# Patient Record
Sex: FEMALE | Race: BLACK OR AFRICAN AMERICAN | Employment: FULL TIME | URBAN - METROPOLITAN AREA
[De-identification: names, ages, dates, MRNs, and addresses within clinical notes are randomized per-mention and may not be internally consistent; named-entity substitution may affect disease eponyms.]

---

## 2018-10-01 ENCOUNTER — HOSPITAL ENCOUNTER (OUTPATIENT)
Dept: GENERAL RADIOLOGY | Age: 43
Discharge: HOME | End: 2018-10-01
Payer: COMMERCIAL

## 2018-10-01 DIAGNOSIS — M25.552 LEFT HIP PAIN: ICD-10-CM

## 2018-10-01 PROCEDURE — 73502 X-RAY EXAM HIP UNI 2-3 VIEWS: CPT

## 2018-10-10 ENCOUNTER — HOSPITAL ENCOUNTER (OUTPATIENT)
Dept: PHYSICAL THERAPY | Age: 43
Discharge: HOME | End: 2018-10-10
Payer: OTHER GOVERNMENT

## 2018-10-10 PROCEDURE — 97110 THERAPEUTIC EXERCISES: CPT

## 2018-10-10 PROCEDURE — 97161 PT EVAL LOW COMPLEX 20 MIN: CPT

## 2018-10-10 NOTE — PROGRESS NOTES
[facility]  [address], [address] [facility] [place]  Ph [phone]  Fx [phone]    Plan of Care/ Statement of Necessity for Physical Therapy Services    Patient name: Moon Beck Start of Care: 10/10/2018   Referral source: MYRON Danielle : 1975    Medical Diagnosis: Pain in left hip [M25.552]   Onset Date:2018    Treatment Diagnosis: Left hip pain   Prior Hospitalization: see medical history Provider#: [id number]   Medications: Verified on Patient summary List    Comorbidities: Headaches   Prior Level of Function: Running 5 days per week including marathon and half-marathon training, exercise classes at the gym 5 days per week      The Plan of Care and following information is based on the information from the initial evaluation. Assessment/ key information: Active 41year old female presents to OP PT with 7 month history of left hip pain. She displayed a sign of possible femoral acetabular impingement with a positive FADDIR test, but negative hip scour. She was also mildly TTP over left hip flexor tendon, but resisted hip flexor MMT did not reproduce symptoms. She displays decreased bilateral hip, knee, and ankle strength, poor neuromuscular control/balance, particularly with single leg activities. Brief running assessment revealed decreased oscar and bilateral lateral whips. Patient will benefit from skilled PT services to modify and progress therapeutic interventions, address functional mobility deficits, address strength deficits, analyze and cue movement patterns, analyze and modify body mechanics/ergonomics and address imbalance/dizziness to return to her PLOF.   Evaluation Complexity History LOW Complexity : Zero comorbidities / personal factors that will impact the outcome / POC; Examination HIGH Complexity : 4+ Standardized tests and measures addressing body structure, function, activity limitation and / or participation in recreation  ;Presentation LOW Complexity : Stable, uncomplicated  ;Clinical Decision Making LOW Complexity : FOTO score of   Overall Complexity Rating: LOW   Problem List: pain affecting function, decrease strength, impaired gait/ balance, decrease ADL/ functional abilitiies, decrease activity tolerance and decrease flexibility/ joint mobility   Treatment Plan may include any combination of the following: Therapeutic exercise, Therapeutic activities, Neuromuscular re-education, Physical agent/modality, Gait/balance training, Manual therapy, Patient education, Self Care training, Functional mobility training, Home safety training and Stair training  Patient / Family readiness to learn indicated by: asking questions, trying to perform skills and interest  Persons(s) to be included in education: patient (P)  Barriers to Learning/Limitations: None  Patient Goal (s): Run without pain in hip  Patient Self Reported Health Status: good  Rehabilitation Potential: good    Goals: To be accomplished in 8 weeks:  Long Term Goals: To be accomplished in 8 weeks:                        1. atient will report compliance with HEP 4-5x/week to aid in rehabilitation program.                        Status at IE: Initiated HEP                        Current: N/A                           2. Patient will increase B LE strength to 5/5 MMT throughout to aid in return to running. Status at IE:                        Current: N/A                           3. Patient will report pain no greater than 1-2/10 with running and sleeping to aid in return to PLOF. Status at IE: up to 8/10 with running                        Current: N/A                           4. Patent will perform 10 left single leg squats to 90* of hip flexion demonstrating good neuromuscular control to demonstrate ability to return to running.                         Status at IE: able to perform single leg squat to table with poor knee control in eccentric phase requiring rest before standing back up                        Current: N/A                           5. Patient will improve FOTO score to 82 points to demonstrate improvement in functional status. Status at IE: 75                        Current: N/A    Frequency / Duration: Patient to be seen 1 times per week for 8 weeks. Patient/ Caregiver education and instruction: Diagnosis, prognosis, activity modification and exercises   [x]  Plan of care has been reviewed with MANUEL Agarwal, PT 10/10/2018 5:13 PM    ________________________________________________________________________    I certify that the above Therapy Services are being furnished while the patient is under my care. I agree with the treatment plan and certify that this therapy is necessary.     Physician's Signature:____________Date:_________TIME:________    [employer], Date and Time must be completed for valid certification **  Please sign and return to [facility] [address]  [facility], [address]  Ph [phone]  Fx [phone]

## 2018-10-22 ENCOUNTER — APPOINTMENT (OUTPATIENT)
Dept: PHYSICAL THERAPY | Age: 43
End: 2018-10-22
Payer: OTHER GOVERNMENT

## 2019-09-11 NOTE — PROGRESS NOTES
[facility]  [address], [address] [facility] [place]  Ph [phone]  Fx [phone]    Physical Therapy Discharge Summary    Patient name: Cande Handley Start of Care: 10/10/18   Referral source: MYRON Yepez : 1975   Medical/Treatment Diagnosis: Pain in left hip [M25.552] Onset Date:3/2018     Prior Hospitalization: see medical history Provider#: [id number]   Medications: Verified on Patient Summary List    Comorbidities: Headaches  Prior Level of Function:Running 5 days per week including marathon and half-marathon training, exercise classes at the gym 5 days per week    Visits from Start of Care: 1    Missed Visits: 2    Reporting Period : 10/10/18 to 10/10/18    Summary of Care:    Unable to formally assess goals as pt failed to show for scheduled followup appts. Please DC to HEP at this time. Thank you for this referral. Pt will require new order if she requires further PT services. [address] [place] [address], S. be accomplished in 8 weeks:                        9. Yuliet Foley will report compliance with HEP 4-5x/week to aid in rehabilitation program.                        HXWIKM at IE: Initiated HEP                        Current: N/A                           6. Patient will increase B LE strength to 5/5 MMT throughout to aid in return to running.                        Status at IE:                        Current: N/A                           3. Patient will report pain no greater than 1-2/10 with running and sleeping to aid in return to PLOF.                         AXZUER at IE: up to 8/10 with running                        Current: N/A                           4. Patent will perform 10 left single leg squats to 90* of hip flexion demonstrating good neuromuscular control to demonstrate ability to return to running.                        Status at IE: able to perform single leg squat to table with poor knee control in eccentric phase requiring rest before standing back up                        Current: N/A                           5. Patient will improve FOTO score to 82 points to demonstrate improvement in functional status.                         FNFWBC at IE: 76                        Current: N/A  ASSESSMENT/RECOMMENDATIONS:  [x]Discontinue therapy: []Patient has reached or is progressing toward set goals      [x]Patient is non-compliant or has abdicated      []Due to lack of appreciable progress towards set goals    Terra Jones, PT 9/11/2019 3:24 PM

## 2019-10-09 PROBLEM — Z80.41 FAMILY HISTORY OF OVARIAN CANCER: Chronic | Status: ACTIVE | Noted: 2019-10-09

## 2022-03-20 PROBLEM — Z80.41 FAMILY HISTORY OF OVARIAN CANCER: Status: ACTIVE | Noted: 2019-10-09

## 2022-10-03 ENCOUNTER — APPOINTMENT (OUTPATIENT)
Dept: GENERAL RADIOLOGY | Age: 47
End: 2022-10-03
Attending: EMERGENCY MEDICINE
Payer: COMMERCIAL

## 2022-10-03 ENCOUNTER — HOSPITAL ENCOUNTER (EMERGENCY)
Age: 47
Discharge: HOME | End: 2022-10-03
Attending: EMERGENCY MEDICINE
Payer: COMMERCIAL

## 2022-10-03 VITALS
HEIGHT: 65 IN | BODY MASS INDEX: 25.83 KG/M2 | SYSTOLIC BLOOD PRESSURE: 138 MMHG | HEART RATE: 78 BPM | TEMPERATURE: 98.2 F | DIASTOLIC BLOOD PRESSURE: 86 MMHG | RESPIRATION RATE: 16 BRPM | OXYGEN SATURATION: 98 % | WEIGHT: 155 LBS

## 2022-10-03 DIAGNOSIS — R09.89 LABILE HYPERTENSION: Primary | ICD-10-CM

## 2022-10-03 DIAGNOSIS — R00.2 PALPITATIONS: ICD-10-CM

## 2022-10-03 LAB
ALBUMIN SERPL-MCNC: 4 G/DL (ref 3.4–5)
ALBUMIN/GLOB SERPL: 1.4 {RATIO} (ref 0.8–1.7)
ALP SERPL-CCNC: 71 U/L (ref 45–117)
ALT SERPL-CCNC: 23 U/L (ref 13–56)
ANION GAP SERPL CALC-SCNC: 4 MMOL/L (ref 3–18)
AST SERPL-CCNC: 17 U/L (ref 10–38)
ATRIAL RATE: 70 BPM
BASOPHILS # BLD: 0 K/UL (ref 0–0.1)
BASOPHILS NFR BLD: 1 % (ref 0–2)
BILIRUB SERPL-MCNC: 0.2 MG/DL (ref 0.2–1)
BUN SERPL-MCNC: 11 MG/DL (ref 7–18)
BUN/CREAT SERPL: 15 (ref 12–20)
CALCIUM SERPL-MCNC: 9.1 MG/DL (ref 8.5–10.1)
CALCULATED P AXIS, ECG09: 20 DEGREES
CALCULATED R AXIS, ECG10: 65 DEGREES
CALCULATED T AXIS, ECG11: 52 DEGREES
CHLORIDE SERPL-SCNC: 109 MMOL/L (ref 100–111)
CO2 SERPL-SCNC: 26 MMOL/L (ref 21–32)
CREAT SERPL-MCNC: 0.72 MG/DL (ref 0.6–1.3)
DIAGNOSIS, 93000: NORMAL
DIFFERENTIAL METHOD BLD: ABNORMAL
EOSINOPHIL # BLD: 0.1 K/UL (ref 0–0.4)
EOSINOPHIL NFR BLD: 1 % (ref 0–5)
ERYTHROCYTE [DISTWIDTH] IN BLOOD BY AUTOMATED COUNT: 12.5 % (ref 11.6–14.5)
GLOBULIN SER CALC-MCNC: 2.9 G/DL (ref 2–4)
GLUCOSE SERPL-MCNC: 107 MG/DL (ref 74–99)
HCT VFR BLD AUTO: 39.7 % (ref 35–45)
HGB BLD-MCNC: 12.9 G/DL (ref 12–16)
IMM GRANULOCYTES # BLD AUTO: 0 K/UL (ref 0–0.04)
IMM GRANULOCYTES NFR BLD AUTO: 0 % (ref 0–0.5)
LYMPHOCYTES # BLD: 2.2 K/UL (ref 0.9–3.6)
LYMPHOCYTES NFR BLD: 32 % (ref 21–52)
MCH RBC QN AUTO: 30.1 PG (ref 24–34)
MCHC RBC AUTO-ENTMCNC: 32.5 G/DL (ref 31–37)
MCV RBC AUTO: 92.5 FL (ref 78–100)
MONOCYTES # BLD: 0.6 K/UL (ref 0.05–1.2)
MONOCYTES NFR BLD: 9 % (ref 3–10)
NEUTS SEG # BLD: 3.9 K/UL (ref 1.8–8)
NEUTS SEG NFR BLD: 57 % (ref 40–73)
NRBC # BLD: 0 K/UL (ref 0–0.01)
NRBC BLD-RTO: 0 PER 100 WBC
P-R INTERVAL, ECG05: 142 MS
PLATELET # BLD AUTO: 472 K/UL (ref 135–420)
PMV BLD AUTO: 9.5 FL (ref 9.2–11.8)
POTASSIUM SERPL-SCNC: 4.3 MMOL/L (ref 3.5–5.5)
PROT SERPL-MCNC: 6.9 G/DL (ref 6.4–8.2)
Q-T INTERVAL, ECG07: 392 MS
QRS DURATION, ECG06: 86 MS
QTC CALCULATION (BEZET), ECG08: 423 MS
RBC # BLD AUTO: 4.29 M/UL (ref 4.2–5.3)
SODIUM SERPL-SCNC: 139 MMOL/L (ref 136–145)
TROPONIN-HIGH SENSITIVITY: 4 NG/L (ref 0–54)
VENTRICULAR RATE, ECG03: 70 BPM
WBC # BLD AUTO: 6.9 K/UL (ref 4.6–13.2)

## 2022-10-03 PROCEDURE — 71045 X-RAY EXAM CHEST 1 VIEW: CPT

## 2022-10-03 PROCEDURE — 85025 COMPLETE CBC W/AUTO DIFF WBC: CPT

## 2022-10-03 PROCEDURE — 80053 COMPREHEN METABOLIC PANEL: CPT

## 2022-10-03 PROCEDURE — 99285 EMERGENCY DEPT VISIT HI MDM: CPT

## 2022-10-03 PROCEDURE — 93005 ELECTROCARDIOGRAM TRACING: CPT

## 2022-10-03 PROCEDURE — 84484 ASSAY OF TROPONIN QUANT: CPT

## 2022-10-03 RX ORDER — TELMISARTAN 40 MG/1
40 TABLET ORAL DAILY
Qty: 30 TABLET | Refills: 0 | Status: SHIPPED | OUTPATIENT
Start: 2022-10-03 | End: 2022-10-03 | Stop reason: SDUPTHER

## 2022-10-03 RX ORDER — TELMISARTAN 40 MG/1
40 TABLET ORAL DAILY
Qty: 30 TABLET | Refills: 0 | Status: SHIPPED | OUTPATIENT
Start: 2022-10-03

## 2022-10-03 NOTE — ED TRIAGE NOTES
C/O L sided dull CP with HTN at home and vision changes x 4 days with nonproductive cough x 2 days. Endorses being on 2 BP meds but has weened self off r/t side effects. Endorses taking 1 dose of Cardis tonight r/t BP at home of 150/115.     VS WNL once placed in room and NSR noted; Denies fever/N/V.

## 2022-10-05 NOTE — ED PROVIDER NOTES
EMERGENCY DEPARTMENT HISTORY AND PHYSICAL EXAM    Date: 10/3/2022  Patient Name: Kennyth Goodpasture    History of Presenting Illness     Chief Complaint   Patient presents with    Chest Pain         History Provided By: Patient    Additional History (Context):   12:29 AM  Kennyth Goodpasture is a 41 y.o. female with PMHX of high blood pressure, asthma, ovarian cyst, TBI, thyroid disease, chronic constipation who presents to the emergency department C/O chest pain. Patient states she has been having abdominal left-sided chest pain going on for 4 days. She is also had a nonproductive cough for 2 days. She noticed her blood pressure was high complaints of blurry vision with activity. She has no shortness of breath. He has no fevers chills nausea or vomiting. She has been followed by the cardiology group at [facility] for recurrent chest pain and hypertension. Patient's had stress echoes which were normal.  Blood pressure was well controlled. Social History  No tobacco alcohol or drugs. Family History  Emphysema in mother. Clotting disorder in father. Colon cancer grandparents. Diabetes in mother. Hypertension in father. PCP: Luis Fernando Rapp PA-C    Current Outpatient Medications   Medication Sig Dispense Refill    telmisartan (MICARDIS) 40 mg tablet Take 1 Tablet by mouth daily. 30 Tablet 0    fluconazole (DIFLUCAN) 150 mg tablet Take one pill with onset of yeast infection symptoms, take second pill 3 days later. 2 Tab 0    Advair Diskus 250-50 mcg/dose diskus inhaler inhale 1 puff by mouth and INTO THE LUNGS twice a day RINSE MOUTH. ..  (REFER TO PRESCRIPTION NOTES). nitrofurantoin, macrocrystal-monohydrate, (Macrobid) 100 mg capsule Take 1 Cap by mouth two (2) times a day.  Indications: urinary tract infection due to E. coli bacteria 14 Cap 0    norethindrone (MICRONOR) 0.35 mg tab take 1 tablet by mouth once daily --START AFTER 10 DAYS OF PROVERA      topiramate (TOPAMAX) 100 mg tablet   0 topiramate (TOPAMAX) 25 mg tablet Take  by mouth two (2) times a day. Past History     Past Medical History:  Past Medical History:   Diagnosis Date    Abnormal Papanicolaou smear of cervix     Chronic constipation     Fibroid, uterine     HSV infection     history    Hypertension     Migraine with aura     Ovarian cyst     TBI (traumatic brain injury) 2009    Thyroid disease        Past Surgical History:  Past Surgical History:   Procedure Laterality Date    HX DILATION AND CURETTAGE      elective        Family History:  Family History   Problem Relation Age of Onset    Diabetes Mother     Thyroid Disease Mother     Cancer Mother         ?uterine cancer    Hypertension Father     Hypertension Sister        Social History:  Social History     Tobacco Use    Smoking status: Never    Smokeless tobacco: Never   Vaping Use    Vaping Use: Never used   Substance Use Topics    Alcohol use: No    Drug use: No       Allergies:  No Known Allergies      Review of Systems   Review of Systems   Constitutional: Negative. HENT: Negative. Eyes: Negative. Respiratory: Negative. Cardiovascular:  Positive for chest pain and palpitations. Gastrointestinal: Negative. Endocrine: Negative. Genitourinary: Negative. Musculoskeletal: Negative. Skin: Negative. Allergic/Immunologic: Negative. Neurological: Negative. Hematological: Negative. Psychiatric/Behavioral: Negative. All other systems reviewed and are negative. Physical Exam     Vitals:    10/03/22 0051 10/03/22 0106 10/03/22 0121 10/03/22 0136   BP: 123/81 134/85 132/79 138/86   Pulse: 72 80 76 78   Resp: 16 15 17 16   Temp:       SpO2: 98% 97% 97% 98%   Weight:       Height:         Physical Exam  Vitals and nursing note reviewed. Constitutional:       General: She is not in acute distress. Appearance: She is well-developed. She is not diaphoretic. HENT:      Head: Normocephalic and atraumatic.    Eyes: General: No scleral icterus. Extraocular Movements:      Right eye: Normal extraocular motion. Left eye: Normal extraocular motion. Conjunctiva/sclera: Conjunctivae normal.      Pupils: Pupils are equal, round, and reactive to light. Neck:      Trachea: No tracheal deviation. Cardiovascular:      Rate and Rhythm: Normal rate and regular rhythm. Heart sounds: Normal heart sounds. Pulmonary:      Effort: Pulmonary effort is normal. No respiratory distress. Breath sounds: Normal breath sounds. No stridor. Abdominal:      General: Bowel sounds are normal. There is no distension. Palpations: Abdomen is soft. Tenderness: There is no abdominal tenderness. There is no rebound. Musculoskeletal:         General: No tenderness. Normal range of motion. Cervical back: Normal range of motion and neck supple. Comments: Grossly unremarkable without abnormalities   Skin:     General: Skin is warm and dry. Capillary Refill: Capillary refill takes less than 2 seconds. Findings: No erythema or rash. Neurological:      Mental Status: She is alert and oriented to person, place, and time. GCS: GCS eye subscore is 4. GCS verbal subscore is 5. GCS motor subscore is 6. Cranial Nerves: No cranial nerve deficit. Motor: No weakness. Psychiatric:         Mood and Affect: Mood normal.         Behavior: Behavior normal.         Thought Content:  Thought content normal.         Judgment: Judgment normal.       Diagnostic Study Results     Labs -  Lab Results         Contains abnormal data CBC WITH AUTOMATED DIFF (Final result)   Component (Lab Inquiry)  Collection Time Result Time WBC RBC HGB HCT MCV MCH MCHC RDW PLT MPLV   10/03/22 00:27:00 10/03/22 00:48:13 6.9 4.29 12.9 39.7 92.5 30.1 32.5 12.5 472 High  9.5   Previous Results   08/13/13 02:26:00 08/13/13 03:21:35    35.3         08/13/13 02:26:00 08/13/13 03:21:35   11.9 Low           08/12/13 05:00:00 08/12/13 05:28:54 9.5 4.01 Low  12.1 35.4 88.3 30.2 34.2 12.8 215 10.0       Collection Time Result Time NRBC ANRBC GRANS LYMPH MONOS EOS BASOS IG ABG ABL   10/03/22 00:27:00 10/03/22 00:48:13 0.0 0.00 57 32 9 1 1 0 3.9 2.2   Previous Results   08/13/13 02:26:00 08/13/13 03:21:35             08/13/13 02:26:00 08/13/13 03:21:35             08/12/13 05:00:00 08/12/13 05:28:54   77 High  14 Low  8 1 0  7.3 1.3       Collection Time Result Time ABM PAZ ABB AIG DF   10/03/22 00:27:00 10/03/22 00:48:13 0.6 0.1 0.0 0.0 AUTOMATED   Previous Results   08/13/13 02:26:00 08/13/13 03:21:35        08/13/13 02:26:00 08/13/13 03:21:35        08/12/13 05:00:00 08/12/13 05:28:54 0.8 0.1 0.0  AUTOMATED         Final result                           Contains abnormal data METABOLIC PANEL, COMPREHENSIVE (Final result)   Component (Lab Inquiry)  Collection Time Result Time NA K CL CO2 AGAP GLU BUN CREA BUCR GFRAA   10/03/22 00:27:00 10/03/22 00:54:49 139 4.3 109 26 4 107 High  11 0.72 15 >60       Collection Time Result Time GFRNA CA TBIL GPT SGOT AP TP ALB GLOB AGRAT   10/03/22 00:27:00 10/03/22 00:54:49 >60   (NOTE)   Estimated GFR . .. 9.1 0.2 23 17 71 6.9 4.0 2.9 1.4         Final result                          TROPONIN-HIGH SENSITIVITY (Final result)   Component (Lab Inquiry)  Collection Time Result Time TROPHS   10/03/22 00:27:00 10/03/22 01:00:47 4   A HS troponin value ch. .. Final result             Radiologic Studies -   XR CHEST PORT   Final Result      No acute findings in the chest.         CT Results  (Last 48 hours)      None          CXR Results  (Last 48 hours)      None            Medications given in the ED-  Medications - No data to display      Medical Decision Making   I am the first provider for this patient. I reviewed the vital signs, available nursing notes, past medical history, past surgical history, family history and social history. Vital Signs-Reviewed the patient's vital signs.     Pulse Oximetry Analysis - 98% on room air    Cardiac Monitor:  Rate: 75 bpm  Rhythm: Sinus rhythm     EKG interpretation: (Preliminary)  12:28 AM   Normal sinus rhythm, rate 70, normal ST segments, QTC is 423. EKG read by Meron Jiménez MD      Records Reviewed: NURSING NOTES AND PREVIOUS MEDICAL RECORDS    Provider Notes (Medical Decision Making):   Patient with 4 days of constant chest pain EKG and troponins are unremarkable. Records reviewed showing cardiology evaluating her for the same with unremarkable findings. Stress were negative. Given normal EKG normal heart rate without oxygen problems very unlikely this is not PE. Wells criteria for PE are negative. PERC rules are negative. Procedures:  Procedures    ED Course:   12:29 AM : Initial assessment performed. The patients presenting problems have been discussed, and they are in agreement with the care plan formulated and outlined with them. I have encouraged them to ask questions as they arise throughout their visit. Diagnosis and Disposition       DISCHARGE NOTE:  3:46 AM  Barbara Spears's  results have been reviewed with her. She has been counseled regarding her diagnosis, treatment, and plan. She verbally conveys understanding and agreement of the signs, symptoms, diagnosis, treatment and prognosis and additionally agrees to follow up as discussed. She also agrees with the care-plan and conveys that all of her questions have been answered. I have also provided discharge instructions for her that include: educational information regarding their diagnosis and treatment, and list of reasons why they would want to return to the ED prior to their follow-up appointment, should her condition change. She has been provided with education for proper emergency department utilization. CLINICAL IMPRESSION:    1. Labile hypertension    2. Palpitations        PLAN:  1. D/C Home  2.    Discharge Medication List as of 10/3/2022  3:40 AM        CONTINUE these medications which have CHANGED    Details   telmisartan (MICARDIS) 40 mg tablet Take 1 Tablet by mouth daily. , Normal, Disp-30 Tablet, R-0           CONTINUE these medications which have NOT CHANGED    Details   fluconazole (DIFLUCAN) 150 mg tablet Take one pill with onset of yeast infection symptoms, take second pill 3 days later., Normal, Disp-2 Tab, R-0      Advair Diskus 250-50 mcg/dose diskus inhaler inhale 1 puff by mouth and INTO THE LUNGS twice a day RINSE MOUTH. ..  (REFER TO PRESCRIPTION NOTES). , Historical Med, ANASTASIA      nitrofurantoin, macrocrystal-monohydrate, (Macrobid) 100 mg capsule Take 1 Cap by mouth two (2) times a day. Indications: urinary tract infection due to E. coli bacteria, Normal, Disp-14 Cap,R-0      norethindrone (MICRONOR) 0.35 mg tab take 1 tablet by mouth once daily --START AFTER 10 DAYS OF PROVERA, Historical Med      !! topiramate (TOPAMAX) 100 mg tablet Historical Med, R-0      !! topiramate (TOPAMAX) 25 mg tablet Take  by mouth two (2) times a day., Historical Med       !! - Potential duplicate medications found. Please discuss with provider. 3.   Follow-up Information       Follow up With Specialties Details Why Contact Info    Renzo Clinton PA-C Physician Assistant  Talk to [facility] regarding her medications shall be a good reference for your symptoms. [facility] [address], [address]  [address] [facility]  [phone]      Savanah Gordillo MD Cardiovascular Disease Physician  As needed [address] [place] [address] [phone]            _______________________________    This note was partially transcribed via voice recognition software. Although efforts have been made to catch any discrepancies, it may contain sound alike words, grammatical errors, or nonsensical words.

## 2024-05-07 ENCOUNTER — HOSPITAL ENCOUNTER (OUTPATIENT)
Facility: HOSPITAL | Age: 49
Setting detail: RECURRING SERIES
Discharge: HOME | End: 2024-05-10
Payer: COMMERCIAL

## 2024-05-07 PROCEDURE — 97110 THERAPEUTIC EXERCISES: CPT

## 2024-05-07 PROCEDURE — 97162 PT EVAL MOD COMPLEX 30 MIN: CPT

## 2024-05-07 PROCEDURE — 97530 THERAPEUTIC ACTIVITIES: CPT

## 2024-05-07 NOTE — PROGRESS NOTES
PT DAILY TREATMENT NOTE/HIP [id number]      Patient Name: Charlotte Mayorga    Date: 2024    : 1975  Insurance: Payor: [employer] / Plan: [facility] [employer] / Product Type: *No Product type* /      Patient  verified yes     Visit #   Current / Total 1 16   Time   In / Out 12:48pm 1:0pm   Pain   In / Out 3 2   Subjective Functional Status/Changes: See below      Treatment Area: Pain in right hip [M25.551]    SUBJECTIVE  Pain Level (0-10 scale): see above  [x]constant []intermittent [x]improving []worsening []no change since onset    Any medication changes, allergies to medications, adverse drug reactions, diagnosis change, or new procedure performed?: [x] No    [] Yes (see summary sheet for update)  Subjective functional status/changes:     PMHx/Surgical Hx: HTN, high cholesterol, migraines, abdominal plasty   PLOF: active lifestyle - gym 4-5x a week( exercises classes, RT and cardio machines)    Mechanism of Injury: 24 - went to the gym the day before and did \"legs\" and then went to the gym again on  and worked out the \"legs\" again - did the splits with right leg FWD- \"heared a low pop\"  - groin pain but also felt it in the buttocks  -able to walk afterwards with a limp  - went to the MD a few days later that  - suspected buttocks \"pulled a ligament\"   x-ray - no fx noted  Current symptoms/Complaints:   - referred symptoms posteriorly> laterally down the right leg  - \"my walking feels different\" - \"can't feel like I can put much pressure\"  - driving: 10-15 min causes pain, sitting over time is painful  and numbness onset  - MH improves pain   - pain with STS transfer    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

## 2024-05-07 NOTE — PROGRESS NOTES
[facility] [address], [address], [place] [address]   Phone: [phone]     Fax: [phone]       Plan of Care/ Statement of Necessity for Physical Therapy Services    Patient name: Charlotte Mayorga Start of Care: 2024   Referral source: Flip Delgado DO : 1975    Medical Diagnosis: Pain in right hip [M25.551]       Onset Date:24    Treatment Diagnosis: M25.551  RIGHT HIP PAIN                             Prior Hospitalization: see medical history Provider#: [id number]   Medications: Verified on Patient Summary List     Comorbidities: HTN (medication), high cholesterol (medication), migraines, abdominal plasty   Prior Level of Function: active lifestyle - gym 4-5x a week( exercises classes, RT and cardio machines)       The Plan of Care and following information is based on the information from the initial evaluation.  Assessment/ pollard information: Ms. Mayorga is a 48 year old female who reports to OPPT with c/o right hip pain. The pt reports that on 24 she completed the \"splits\" (which she has done with ease before) after two days of \"leg days\" where the right leg was FWD and \"heared a low pop\". She described right groin pain that referred to her buttocks and caused a limp. The pt reports that she saw a MD a few days later who referred her to ortho and had an x-ray showing no fx. The pt states her MD is concerned that she has a \"pulled ligament\".  No MRI completed as of IE. Pt states that she has referred symptoms posteriorly> laterally down the right leg but points to pain at medial/inferior pelvic groin pain on right hip girdle. Pts states that the pain increases with return to exercise, walking, driving and SLS transfers. States onset of pain with sitting/driving >10 mins and can also cause referred numbness. Sates MH can improve pain. The reports a history of \"W\" sitting with a demo of increased hip FA IR mobility and

## 2024-05-15 ENCOUNTER — HOSPITAL ENCOUNTER (OUTPATIENT)
Facility: HOSPITAL | Age: 49
Setting detail: RECURRING SERIES
Discharge: HOME | End: 2024-05-18
Payer: COMMERCIAL

## 2024-05-15 PROCEDURE — 97112 NEUROMUSCULAR REEDUCATION: CPT

## 2024-05-15 PROCEDURE — 97530 THERAPEUTIC ACTIVITIES: CPT

## 2024-05-15 PROCEDURE — 97140 MANUAL THERAPY 1/> REGIONS: CPT

## 2024-05-15 PROCEDURE — 97110 THERAPEUTIC EXERCISES: CPT

## 2024-05-15 NOTE — PROGRESS NOTES
PHYSICAL / OCCUPATIONAL THERAPY - DAILY TREATMENT NOTE     Patient Name: Charlotte Mayorga    Date: 5/15/2024    : 1975  Insurance: Payor: BALTAZAR / Plan: [facility] / Product Type: *No Product type* /      Patient  verified Yes     Visit #   Current / Total 2 16   Time   In / Out 1045 1135   Pain   In / Out 3 4-5   Subjective Functional Status/Changes: Patient reports pain with walking, prolonged driving/sitting, pain at night requiring frequent positional changes for comfort. Has been going to gym doing light upper body workout. Reports MRI ordered and to be scheduled    Changes to:  Allergies, Med Hx, Sx Hx?   no       TREATMENT AREA =  Pain in right hip [M25.551]    OBJECTIVE    Modalities Rationale:     decrease inflammation and decrease pain to improve patient's ability to progress to PLOF and address remaining functional goals.     min [] Estim Unattended, type/location:                                      []  w/ice    []  w/heat    min [] Estim Attended, type/location:                                     []  w/US     []  w/ice    []  w/heat    []  TENS insruct      min []  Mechanical Traction: type/lbs                   []  pro   []  sup   []  int   []  cont    []  before manual    []  after manual    min []  Ultrasound, settings/location:      min []  Iontophoresis w/ dexamethasone, location:                                               []  take home patch       []  in clinic     5   min  unbilled [x]  Ice     []  Heat    location/position: CP to right med/post right thigh in supine , legs elevated    min []  Paraffin,  details:     min []  Vasopneumatic Device, press/temp:     min []  Whirlpool / Fluido:    If using vaso (only need to measure limb vaso being performed on)      pre-treatment girth :       post-treatment girth :       measured at (landmark location) :      min []  Other:    Skin assessment post-treatment (if applicable):    []  intact    []  redness- no adverse

## 2024-05-17 ENCOUNTER — TELEPHONE (OUTPATIENT)
Facility: HOSPITAL | Age: 49
End: 2024-05-17

## 2024-05-17 NOTE — TELEPHONE ENCOUNTER
Called pt. to schedule follow up visits. Gave her times for [facility] open[facility] in the AM for 5/22 She will call back when she is in front of her work calendar.

## 2024-06-05 ENCOUNTER — TELEPHONE (OUTPATIENT)
Facility: HOSPITAL | Age: 49
End: 2024-06-05

## 2024-06-18 ENCOUNTER — TELEPHONE (OUTPATIENT)
Facility: HOSPITAL | Age: 49
End: 2024-06-18

## 2024-06-24 ENCOUNTER — HOSPITAL ENCOUNTER (OUTPATIENT)
Facility: HOSPITAL | Age: 49
Setting detail: RECURRING SERIES
Discharge: HOME | End: 2024-06-27
Payer: COMMERCIAL

## 2024-06-24 PROCEDURE — 97112 NEUROMUSCULAR REEDUCATION: CPT

## 2024-06-24 PROCEDURE — 97530 THERAPEUTIC ACTIVITIES: CPT

## 2024-06-24 NOTE — PROGRESS NOTES
PHYSICAL / OCCUPATIONAL THERAPY - DAILY TREATMENT NOTE     Patient Name: Charlotte Mayorga    Date: 2024    : 1975  Insurance: Payor: BALTAZAR / Plan: [facility] / Product Type: *No Product type* /      Patient  verified Yes     Visit #   Current / Total 3 16   Time   In / Out 856 927   Pain   In / Out 6 4   Subjective Functional Status/Changes: Pain essentially unchanged but slight improvement in mobility and function including light workout sessions at gym. Still pain with pressure on back of right leg, prolonged sitting/driving, lying on right side. Sleep disturbed. MRI 24   Changes to:  Allergies, Med Hx, Sx Hx?   no       TREATMENT AREA =  Pain in right hip [M25.551]    OBJECTIVE          Therapeutic Procedures:  Tx Min Billable or 1:1 Min (if diff from Tx Min) Procedure, Rationale, Specifics     24732 Neuromuscular Re-Education (timed):  improve balance, coordination, kinesthetic sense, posture, core stability and proprioception to improve patient's ability to develop conscious control of individual muscles and awareness of position of extremities in order to progress to PLOF and address remaining functional goals. (see flow sheet as applicable)    Details if applicable:       10  75014 Therapeutic Activity (timed):  use of dynamic activities replicating functional movements to increase ROM, strength, coordination, balance, and proprioception in order to improve patient's ability to progress to PLOF and address remaining functional goals.  (see flow sheet as applicable)    Details if applicable: reevaluation, updated HEP                   [address] [place] [address] Totals Reminder: bill using total billable min of TIMED therapeutic procedures (example: do not include dry needle or estim unattended, both untimed codes, in totals to left)  8-22 min = 1 unit; 23-37 min = 2 units; 38-52 min = 3 units; 53-67 min = 4 units; 68-82 min = 5 units   Total Total     TOTAL TREATMENT TIME:        31

## 2024-06-24 NOTE — PROGRESS NOTES
[facility]  [address], [place] [address]  Phone: [phone]      Fax:  [phone]    Progress Note    Patient name: Charlotte Mayorga Start of Care: ***   Referral source: Flip Delgado DO : 1975   Medical/Treatment Diagnosis: Pain in right hip [M25.551] Onset Date:***     Prior Hospitalization: see medical history Provider#: [id number]   Medications: Verified on Patient Summary List    Comorbidities: ***  Prior Level of Function:***    Visits from Start of Care: ***    Missed Visits: ***    Updated Goals/Measure of Progress: To be achieved in *** weeks:    ***Short Term Goals: STG- To be accomplished in 4 week(s):  1.  Pt will be compliant with HEP to encourage prophylaxis.   Eval:provided and reviewed HEP   Status 5/15/24: updated HEP  Current 24: compliant with daily HEP     2.  Pt will demo a LTR of <13 in bilaterally and a negative hip ADD Drop test bilaterally to increase nqucv-zazruu-gvpaycr mobility required for ADLs/IADLs.  Eval:Trunk Rot  Right: 15in,  Left 13in; Hip ADD Drop Test: Positive bilaterally   Status  5/15/24: LTR 15 in bilaterally at beginning of session, right ADT improved post session, progressing     Long Term Goals: LTG- To be accomplished in 8 week(s):  1.  Pt will report a worst pain in right hip/groin at <3/10 to demo an increase in functional activity tolerance and improve QOL..  Eval:worst pain = 8/10  Status on 5/15/24: entering clinic with 3/10 pain right distal buttock/proximal med/post thigh, post session 4-5/10 pain with ambulation   Current 24: max pain 7/10 with prolonged sitting/driving, progressing     2.  Pt will b e able to walk a mile and stand for 1 hour with minimal to no difficulty to return to PLOF without symptom onset.  Eval:\"quite a bit of difficulty\" for both per FOTO assessment; subjectively = \"my walking feels different\" - \"can't feel like I can put much pressure on

## 2024-07-12 ENCOUNTER — TELEPHONE (OUTPATIENT)
Facility: HOSPITAL | Age: 49
End: 2024-07-12

## 2024-07-12 NOTE — TELEPHONE ENCOUNTER
Sw pt she would like to continue but it a conflict with work schedule and will be calling us back.

## 2024-08-01 ENCOUNTER — TELEPHONE (OUTPATIENT)
Facility: HOSPITAL | Age: 49
End: 2024-08-01

## 2024-08-01 NOTE — TELEPHONE ENCOUNTER
Called pt. to see if she is able to come in on open times ext week. one of the times work for her. Wants to stay on waitlist.

## 2024-08-07 ENCOUNTER — TELEPHONE (OUTPATIENT)
Facility: HOSPITAL | Age: 49
End: 2024-08-07

## 2024-08-07 NOTE — TELEPHONE ENCOUNTER
Called pt. to see if  eis able to come in at  1:20 opeigs today and AB's 2:00 openig tomorrow She declined.

## 2024-08-29 ENCOUNTER — APPOINTMENT (OUTPATIENT)
Facility: HOSPITAL | Age: 49
End: 2024-08-29
Payer: COMMERCIAL

## 2024-09-18 ENCOUNTER — TELEPHONE (OUTPATIENT)
Facility: HOSPITAL | Age: 49
End: 2024-09-18

## 2024-09-27 ENCOUNTER — HOSPITAL ENCOUNTER (OUTPATIENT)
Facility: HOSPITAL | Age: 49
Setting detail: RECURRING SERIES
Discharge: HOME | End: 2024-09-30
Payer: COMMERCIAL

## 2024-09-27 PROCEDURE — 97530 THERAPEUTIC ACTIVITIES: CPT

## 2024-09-27 PROCEDURE — 97112 NEUROMUSCULAR REEDUCATION: CPT

## 2024-09-27 NOTE — PROGRESS NOTES
[facility] at the [facility] [address], [place] [address]  Phone: [phone]      Fax:  [phone]            Discharge Summary      Patient name: Charlotte Mayorga Start of Care: 2024   Referral source: Flip Delgado DO : 1975               Medical Diagnosis: Pain in right hip [M25.551]        Onset Date:24               Treatment Diagnosis: M25.551  RIGHT HIP PAIN                             Prior Hospitalization: see medical history Provider#: [id number]   Medications: Verified on Patient Summary List      Comorbidities: HTN (medication), high cholesterol (medication), migraines, abdominal plasty   Prior Level of Function: active lifestyle - gym 4-5x a week( exercises classes, RT and cardio machines)   Visits from Start of Care: 4    Missed Visits: 0      If an interpreting service is utilized for treatment of this patient, the contents of this document represent the material reviewed with the patient via the .     Reporting Period : 24 to 24    Goals/Measure of Progress:    Short Term Goals: STG- To be accomplished in 4 week(s):  1.  Pt will be compliant with HEP to encourage prophylaxis.   Eval:provided and reviewed HEP   Status on 24: compliant with daily HEP  Current 24: has returned to regular ex routine at gym 4x per week however has reduced routine due to upcoming surgery for tumor, reports increased leg pain /10 with ex routine and reduction in symptoms with rest, patient was instructed in updated HEP for improved lumbo-pelvic function and gentle HS retraining     2.  Pt will demo a LTR of <13 in bilaterally and a negative hip ADD Drop test bilaterally to increase hcusp-qmdpsy-srtlojl mobility required for ADLs/IADLs.  Eval:Trunk Rot  Right: 15in,  Left 13in; Hip ADD Drop Test: Positive bilaterally   Status  5/15/24: LTR 15 in bilaterally at beginning of session, right ADT improved post session, 
by 20# so that it is equal to the left LE strength required for her to perform daily activities with decreased pain and symptom levels.  Eval:LE Hand Held dynamometer:  Flexion: right = 10# P!, left = 30#  Extension:  right = 50#, left = 52#  Status on 6/23/24: seated MMT right knee Flex 4/5 with reports of pain, progressing  Current 9/27/24:pain with right HS testing in supine, 4/5 MMT, unchanged since last test    PLAN  No  Continue plan of care  []  Upgrade activities as tolerated  [x]  Discharge due to :patient being scheduled for adrenal tumor surgery 9/30/24. Recommend to initiate PT at a later date once patient cleared by MD.      Tish Damon PT    9/27/2024    8:39 AM    Future Appointments   Date Time Provider Department Center   9/27/2024 12:40 PM Tish Damon, PT [facility]

## 2025-02-06 ENCOUNTER — APPOINTMENT (OUTPATIENT)
Facility: HOSPITAL | Age: 50
End: 2025-02-06
Attending: EMERGENCY MEDICINE
Payer: COMMERCIAL

## 2025-02-06 ENCOUNTER — HOSPITAL ENCOUNTER (EMERGENCY)
Facility: HOSPITAL | Age: 50
Discharge: HOME | End: 2025-02-06
Attending: EMERGENCY MEDICINE
Payer: COMMERCIAL

## 2025-02-06 VITALS
BODY MASS INDEX: 24.11 KG/M2 | OXYGEN SATURATION: 98 % | HEIGHT: 66 IN | HEART RATE: 79 BPM | WEIGHT: 150 LBS | DIASTOLIC BLOOD PRESSURE: 75 MMHG | RESPIRATION RATE: 18 BRPM | TEMPERATURE: 97.7 F | SYSTOLIC BLOOD PRESSURE: 107 MMHG

## 2025-02-06 DIAGNOSIS — G89.18 POST-OP PAIN: Primary | ICD-10-CM

## 2025-02-06 LAB — ECHO BSA: 1.78 M2

## 2025-02-06 PROCEDURE — 93971 EXTREMITY STUDY: CPT

## 2025-02-06 PROCEDURE — 6370000000 HC RX 637 (ALT 250 FOR IP): Performed by: EMERGENCY MEDICINE

## 2025-02-06 PROCEDURE — 99284 EMERGENCY DEPT VISIT MOD MDM: CPT

## 2025-02-06 RX ORDER — IBUPROFEN 600 MG/1
600 TABLET, FILM COATED ORAL
Status: COMPLETED | OUTPATIENT
Start: 2025-02-06 | End: 2025-02-06

## 2025-02-06 RX ORDER — OXYCODONE HYDROCHLORIDE 5 MG/1
10 TABLET ORAL
Status: COMPLETED | OUTPATIENT
Start: 2025-02-06 | End: 2025-02-06

## 2025-02-06 RX ORDER — OXYCODONE HYDROCHLORIDE 5 MG/1
5 TABLET ORAL EVERY 6 HOURS PRN
Qty: 12 TABLET | Refills: 0 | Status: SHIPPED | OUTPATIENT
Start: 2025-02-06 | End: 2025-02-09

## 2025-02-06 RX ADMIN — OXYCODONE HYDROCHLORIDE 10 MG: 5 TABLET ORAL at 15:32

## 2025-02-06 RX ADMIN — IBUPROFEN 600 MG: 600 TABLET, FILM COATED ORAL at 15:31

## 2025-02-06 ASSESSMENT — PAIN DESCRIPTION - LOCATION: LOCATION: LEG

## 2025-02-06 ASSESSMENT — PAIN SCALES - GENERAL
PAINLEVEL_OUTOF10: 8
PAINLEVEL_OUTOF10: 9

## 2025-02-06 ASSESSMENT — PAIN - FUNCTIONAL ASSESSMENT: PAIN_FUNCTIONAL_ASSESSMENT: 0-10

## 2025-02-06 ASSESSMENT — PAIN DESCRIPTION - ORIENTATION: ORIENTATION: RIGHT

## 2025-02-06 NOTE — ED TRIAGE NOTES
Patient arrives ambulatory to ED with own crutches, sent from PCP to be evaluated for blood clots in legs, has ACL surgery one week ago, +swelling to [facility] where had surgery

## 2025-02-07 NOTE — ED PROVIDER NOTES
EMERGENCY DEPARTMENT HISTORY AND PHYSICAL EXAM      Date: 2/6/2025  Patient Name: Charlotte Mayorga    History of Presenting Illness     Chief Complaint   Patient presents with    Leg Swelling       History Provided By: Patient    HPI: Charlotte Mayorga, 49 y.o. female with PMHx as noted below presents the emergency department for evaluation of right leg pain.  Patient with recent orthopedic surgery on her right knee, has been experiencing some progressive pain and swelling, worse in the popliteal fossa and calf was referred to the ED to rule out DVT.  Otherwise has had no fevers or infectious symptoms.  PCP: Flip Delgado DO    No current facility-administered medications for this encounter.     Current Outpatient Medications   Medication Sig Dispense Refill    oxyCODONE (ROXICODONE) 5 MG immediate release tablet Take 1 tablet by mouth every 6 hours as needed for Pain for up to 3 days. Intended supply: 3 days. Take lowest dose possible to manage pain Max Daily Amount: 20 mg 12 tablet 0    fluconazole (DIFLUCAN) 150 MG tablet Take one pill with onset of yeast infection symptoms, take second pill 3 days later.      fluticasone-salmeterol (ADVAIR DISKUS) 250-50 MCG/ACT AEPB diskus inhaler inhale 1 puff by mouth and INTO THE LUNGS twice a day RINSE MOUTH...  (REFER TO PRESCRIPTION NOTES).      nitrofurantoin, macrocrystal-monohydrate, (MACROBID) 100 MG capsule Take 100 mg by mouth 2 times daily      norethindrone (MICRONOR) 0.35 MG tablet take 1 tablet by mouth once daily --START AFTER 10 DAYS OF PROVERA      telmisartan (MICARDIS) 40 MG tablet Take 40 mg by mouth daily      topiramate (TOPAMAX) 100 MG tablet ceived the following from [facility]: Outside name: topiramate (TOPAMAX) 100 mg tablet      topiramate (TOPAMAX) 25 MG tablet Take by mouth 2 times daily         Past History     Past Medical History:  Past Medical History:   Diagnosis Date    Abnormal Papanicolaou smear of cervix     Chronic  constipation     Fibroid, uterine     HSV infection     history    Hypertension     Migraine with aura     Ovarian cyst     TBI (traumatic brain injury) 2009    Thyroid disease        Past Surgical History:  Past Surgical History:   Procedure Laterality Date    DILATION AND CURETTAGE OF UTERUS      elective        Family History:  Family History   Problem Relation Age of Onset    Hypertension Sister     Hypertension Father     Cancer Mother         ?uterine cancer    Diabetes Mother     Thyroid Disease Mother        Social History:  Social History     Tobacco Use    Smoking status: Never    Smokeless tobacco: Never   Substance Use Topics    Alcohol use: No    Drug use: No       Allergies:  No Known Allergies      Review of Systems   Review of Systems    Physical Exam   Physical Exam    GENERAL: alert and oriented, no acute distress  Musculoskeletal: There is some mild calf and tenderness in the popliteal fossa.  Surgical incisions are healing well without any signs of infection.  There are palpable distal pulses.  No warmth or erythema.      Diagnostic Study Results     Labs -   No results found for this or any previous visit (from the past 12 hour(s)).    Radiologic Studies -   Non-plain film images such as CT, Ultrasound and MRI are read by the radiologist. Plain radiographic images are visualized and preliminarily interpreted by me with the below findings:        Interpretation per the Radiologist below, if available at the time of this note:  Vascular duplex lower extremity venous right   Final Result              Medical Decision Making       I reviewed the vital signs, available nursing notes, past medical history, past surgical history, family history and social history.    Vital Signs-Reviewed the patient's vital signs.  No data found.      Provider Notes (Medical Decision Making):   On presentation, the patient is well appearing, in no acute distress with reassuring vital signs.  Based on my

## 2025-03-20 NOTE — H&P
[facility]  [address] [place]  [facility] [address]  Tel:  [phone]  Fax: [phone]    Patient: JAMAR MESSINA    YOB: 1975   Birth Sex: Female   Date: 03/20/2025 10:15 AM   Visit Type: Office Visit - GYN     Assessment/Plan  # Detail Type Description    1. Assessment Dysmenorrhea (N94.6).    Impression Patient with persistent dysmenorrhea post ablation.  Plan hysteroscopy, D&C..         2. Assessment Metrorrhagia (N92.1).    Impression Patient with irregular spotting post ablation.  Family history of uterine cancer.  Plan hysteroscopy, D&C..              This 49 year old patient presents for Metrorrhagia:.    History of Present Illness  1.  Metrorrhagia:   The symptoms began 10 months ago and generally lasts varies. The symptoms are reported as being moderate. The symptoms occur randomly. The location is uterine. The symptoms are described as cramping. Aggravating factors include menses. Relieving factors include time. The patient states the symptoms are acute and are unchanged. Patient with persistent cramping post ablation.  Family history of uterine cancer.  Plan hysteroscopy, D&C. I reviewed with patient risks/benefits/alternatives to surgery. She understands that the risks include but are not limited to: bleeding, need for blood transfusion, risks associated with blood transfusion, infection, injury to internal or adjacent organs, risks associated with receiving anesthesia, post operative complications, readmission to the hospital, wound infection or separation, dvt, pulmonary embolus, pneumonia and death associated with surgery. All questions were answered and surgical consent to be signed at hospital.            Gynecologic History  03/20/2025 10:15 AM  Date of last Pap: 07/29/2011.  Medical History  (Detailed)  Disease Onset Date Comments   Anxiety     Asthma     Chronic migraine without aura with status migrainosus     Colon polyp     Diabetes

## 2025-03-21 ENCOUNTER — HOSPITAL ENCOUNTER (OUTPATIENT)
Facility: HOSPITAL | Age: 50
Discharge: HOME | End: 2025-03-24
Payer: COMMERCIAL

## 2025-03-21 ENCOUNTER — HOSPITAL ENCOUNTER (OUTPATIENT)
Facility: HOSPITAL | Age: 50
Discharge: HOME | End: 2025-03-23
Payer: COMMERCIAL

## 2025-03-21 DIAGNOSIS — Z01.818 PREOP TESTING: ICD-10-CM

## 2025-03-21 PROCEDURE — 93005 ELECTROCARDIOGRAM TRACING: CPT | Performed by: OBSTETRICS & GYNECOLOGY

## 2025-03-24 LAB
EKG ATRIAL RATE: 73 BPM
EKG DIAGNOSIS: NORMAL
EKG P AXIS: 31 DEGREES
EKG P-R INTERVAL: 134 MS
EKG Q-T INTERVAL: 372 MS
EKG QRS DURATION: 80 MS
EKG QTC CALCULATION (BAZETT): 409 MS
EKG R AXIS: 71 DEGREES
EKG T AXIS: 51 DEGREES
EKG VENTRICULAR RATE: 73 BPM

## 2025-03-24 PROCEDURE — 93010 ELECTROCARDIOGRAM REPORT: CPT | Performed by: INTERNAL MEDICINE

## 2025-03-25 ENCOUNTER — ANESTHESIA EVENT (OUTPATIENT)
Facility: HOSPITAL | Age: 50
End: 2025-03-25
Payer: COMMERCIAL

## 2025-03-27 ENCOUNTER — HOSPITAL ENCOUNTER (OUTPATIENT)
Facility: HOSPITAL | Age: 50
Setting detail: OUTPATIENT SURGERY
Discharge: HOME | End: 2025-03-27
Attending: OBSTETRICS & GYNECOLOGY | Admitting: OBSTETRICS & GYNECOLOGY
Payer: COMMERCIAL

## 2025-03-27 ENCOUNTER — ANESTHESIA (OUTPATIENT)
Facility: HOSPITAL | Age: 50
End: 2025-03-27
Payer: COMMERCIAL

## 2025-03-27 VITALS
SYSTOLIC BLOOD PRESSURE: 116 MMHG | WEIGHT: 153.9 LBS | OXYGEN SATURATION: 100 % | RESPIRATION RATE: 16 BRPM | HEIGHT: 66 IN | BODY MASS INDEX: 24.73 KG/M2 | TEMPERATURE: 98 F | HEART RATE: 83 BPM | DIASTOLIC BLOOD PRESSURE: 68 MMHG

## 2025-03-27 PROBLEM — N93.9 ABNORMAL UTERINE BLEEDING: Status: ACTIVE | Noted: 2025-03-27

## 2025-03-27 PROBLEM — N93.9 ABNORMAL UTERINE BLEEDING: Status: RESOLVED | Noted: 2025-03-27 | Resolved: 2025-03-27

## 2025-03-27 LAB — HCG UR QL: NEGATIVE

## 2025-03-27 PROCEDURE — 7100000000 HC PACU RECOVERY - FIRST 15 MIN: Performed by: OBSTETRICS & GYNECOLOGY

## 2025-03-27 PROCEDURE — 3700000001 HC ADD 15 MINUTES (ANESTHESIA): Performed by: OBSTETRICS & GYNECOLOGY

## 2025-03-27 PROCEDURE — 3600000012 HC SURGERY LEVEL 2 ADDTL 15MIN: Performed by: OBSTETRICS & GYNECOLOGY

## 2025-03-27 PROCEDURE — 2709999900 HC NON-CHARGEABLE SUPPLY: Performed by: OBSTETRICS & GYNECOLOGY

## 2025-03-27 PROCEDURE — 7100000010 HC PHASE II RECOVERY - FIRST 15 MIN: Performed by: OBSTETRICS & GYNECOLOGY

## 2025-03-27 PROCEDURE — 3700000000 HC ANESTHESIA ATTENDED CARE: Performed by: OBSTETRICS & GYNECOLOGY

## 2025-03-27 PROCEDURE — 88305 TISSUE EXAM BY PATHOLOGIST: CPT

## 2025-03-27 PROCEDURE — 2580000003 HC RX 258: Performed by: OBSTETRICS & GYNECOLOGY

## 2025-03-27 PROCEDURE — 3600000002 HC SURGERY LEVEL 2 BASE: Performed by: OBSTETRICS & GYNECOLOGY

## 2025-03-27 PROCEDURE — 6370000000 HC RX 637 (ALT 250 FOR IP): Performed by: OBSTETRICS & GYNECOLOGY

## 2025-03-27 PROCEDURE — 6360000002 HC RX W HCPCS: Performed by: NURSE ANESTHETIST, CERTIFIED REGISTERED

## 2025-03-27 PROCEDURE — 7100000011 HC PHASE II RECOVERY - ADDTL 15 MIN: Performed by: OBSTETRICS & GYNECOLOGY

## 2025-03-27 PROCEDURE — 7100000001 HC PACU RECOVERY - ADDTL 15 MIN: Performed by: OBSTETRICS & GYNECOLOGY

## 2025-03-27 PROCEDURE — 2500000003 HC RX 250 WO HCPCS: Performed by: NURSE ANESTHETIST, CERTIFIED REGISTERED

## 2025-03-27 PROCEDURE — 81025 URINE PREGNANCY TEST: CPT

## 2025-03-27 PROCEDURE — 2500000003 HC RX 250 WO HCPCS: Performed by: OBSTETRICS & GYNECOLOGY

## 2025-03-27 PROCEDURE — 2580000003 HC RX 258: Performed by: NURSE ANESTHETIST, CERTIFIED REGISTERED

## 2025-03-27 PROCEDURE — 6360000002 HC RX W HCPCS: Performed by: OBSTETRICS & GYNECOLOGY

## 2025-03-27 RX ORDER — SODIUM CHLORIDE, SODIUM LACTATE, POTASSIUM CHLORIDE, CALCIUM CHLORIDE 600; 310; 30; 20 MG/100ML; MG/100ML; MG/100ML; MG/100ML
INJECTION, SOLUTION INTRAVENOUS
Status: DISCONTINUED | OUTPATIENT
Start: 2025-03-27 | End: 2025-03-27 | Stop reason: SDUPTHER

## 2025-03-27 RX ORDER — MIDAZOLAM HYDROCHLORIDE 1 MG/ML
INJECTION, SOLUTION INTRAMUSCULAR; INTRAVENOUS
Status: DISCONTINUED | OUTPATIENT
Start: 2025-03-27 | End: 2025-03-27 | Stop reason: SDUPTHER

## 2025-03-27 RX ORDER — FENTANYL CITRATE 50 UG/ML
50 INJECTION, SOLUTION INTRAMUSCULAR; INTRAVENOUS EVERY 5 MIN PRN
Status: DISCONTINUED | OUTPATIENT
Start: 2025-03-27 | End: 2025-03-27 | Stop reason: HOSPADM

## 2025-03-27 RX ORDER — EPHEDRINE SULFATE/0.9% NACL/PF 50 MG/5 ML
SYRINGE (ML) INTRAVENOUS
Status: DISCONTINUED | OUTPATIENT
Start: 2025-03-27 | End: 2025-03-27 | Stop reason: SDUPTHER

## 2025-03-27 RX ORDER — DEXAMETHASONE SODIUM PHOSPHATE 4 MG/ML
INJECTION, SOLUTION INTRA-ARTICULAR; INTRALESIONAL; INTRAMUSCULAR; INTRAVENOUS; SOFT TISSUE
Status: DISCONTINUED | OUTPATIENT
Start: 2025-03-27 | End: 2025-03-27 | Stop reason: SDUPTHER

## 2025-03-27 RX ORDER — OXYCODONE HYDROCHLORIDE 5 MG/1
10 TABLET ORAL PRN
Status: DISCONTINUED | OUTPATIENT
Start: 2025-03-27 | End: 2025-03-27 | Stop reason: HOSPADM

## 2025-03-27 RX ORDER — FENTANYL CITRATE 50 UG/ML
INJECTION, SOLUTION INTRAMUSCULAR; INTRAVENOUS
Status: DISCONTINUED | OUTPATIENT
Start: 2025-03-27 | End: 2025-03-27 | Stop reason: SDUPTHER

## 2025-03-27 RX ORDER — ONDANSETRON 2 MG/ML
4 INJECTION INTRAMUSCULAR; INTRAVENOUS
Status: DISCONTINUED | OUTPATIENT
Start: 2025-03-27 | End: 2025-03-27 | Stop reason: HOSPADM

## 2025-03-27 RX ORDER — SODIUM CHLORIDE 9 MG/ML
INJECTION, SOLUTION INTRAVENOUS CONTINUOUS
Status: DISCONTINUED | OUTPATIENT
Start: 2025-03-27 | End: 2025-03-27 | Stop reason: HOSPADM

## 2025-03-27 RX ORDER — KETOROLAC TROMETHAMINE 30 MG/ML
INJECTION, SOLUTION INTRAMUSCULAR; INTRAVENOUS
Status: DISCONTINUED | OUTPATIENT
Start: 2025-03-27 | End: 2025-03-27 | Stop reason: SDUPTHER

## 2025-03-27 RX ORDER — OXYCODONE HYDROCHLORIDE 5 MG/1
5 TABLET ORAL PRN
Status: DISCONTINUED | OUTPATIENT
Start: 2025-03-27 | End: 2025-03-27 | Stop reason: HOSPADM

## 2025-03-27 RX ORDER — NALOXONE HYDROCHLORIDE 0.4 MG/ML
INJECTION, SOLUTION INTRAMUSCULAR; INTRAVENOUS; SUBCUTANEOUS PRN
Status: DISCONTINUED | OUTPATIENT
Start: 2025-03-27 | End: 2025-03-27 | Stop reason: HOSPADM

## 2025-03-27 RX ORDER — LIDOCAINE HYDROCHLORIDE 20 MG/ML
INJECTION, SOLUTION INTRAVENOUS
Status: DISCONTINUED | OUTPATIENT
Start: 2025-03-27 | End: 2025-03-27 | Stop reason: SDUPTHER

## 2025-03-27 RX ORDER — ONDANSETRON 2 MG/ML
INJECTION INTRAMUSCULAR; INTRAVENOUS
Status: DISCONTINUED | OUTPATIENT
Start: 2025-03-27 | End: 2025-03-27 | Stop reason: SDUPTHER

## 2025-03-27 RX ORDER — HYDROMORPHONE HYDROCHLORIDE 1 MG/ML
0.5 INJECTION, SOLUTION INTRAMUSCULAR; INTRAVENOUS; SUBCUTANEOUS EVERY 5 MIN PRN
Status: DISCONTINUED | OUTPATIENT
Start: 2025-03-27 | End: 2025-03-27 | Stop reason: HOSPADM

## 2025-03-27 RX ORDER — PROPOFOL 10 MG/ML
INJECTION, EMULSION INTRAVENOUS
Status: DISCONTINUED | OUTPATIENT
Start: 2025-03-27 | End: 2025-03-27 | Stop reason: SDUPTHER

## 2025-03-27 RX ADMIN — DEXAMETHASONE SODIUM PHOSPHATE 8 MG: 4 INJECTION INTRA-ARTICULAR; INTRALESIONAL; INTRAMUSCULAR; INTRAVENOUS; SOFT TISSUE at 12:52

## 2025-03-27 RX ADMIN — SODIUM CHLORIDE, SODIUM LACTATE, POTASSIUM CHLORIDE, AND CALCIUM CHLORIDE: 600; 310; 30; 20 INJECTION, SOLUTION INTRAVENOUS at 13:05

## 2025-03-27 RX ADMIN — KETOROLAC TROMETHAMINE 15 MG: 30 INJECTION, SOLUTION INTRAMUSCULAR at 13:02

## 2025-03-27 RX ADMIN — SODIUM CHLORIDE: 0.9 INJECTION, SOLUTION INTRAVENOUS at 10:00

## 2025-03-27 RX ADMIN — LIDOCAINE HYDROCHLORIDE 80 MG: 20 INJECTION, SOLUTION INTRAVENOUS at 12:47

## 2025-03-27 RX ADMIN — FENTANYL CITRATE 50 MCG: 50 INJECTION INTRAMUSCULAR; INTRAVENOUS at 12:52

## 2025-03-27 RX ADMIN — Medication 10 MG: at 13:14

## 2025-03-27 RX ADMIN — PROPOFOL 200 MG: 10 INJECTION, EMULSION INTRAVENOUS at 12:48

## 2025-03-27 RX ADMIN — ONDANSETRON 4 MG: 2 INJECTION, SOLUTION INTRAMUSCULAR; INTRAVENOUS at 12:54

## 2025-03-27 RX ADMIN — FENTANYL CITRATE 50 MCG: 50 INJECTION INTRAMUSCULAR; INTRAVENOUS at 13:07

## 2025-03-27 RX ADMIN — MIDAZOLAM 2 MG: 1 INJECTION INTRAMUSCULAR; INTRAVENOUS at 12:41

## 2025-03-27 ASSESSMENT — PAIN - FUNCTIONAL ASSESSMENT: PAIN_FUNCTIONAL_ASSESSMENT: 0-10

## 2025-03-27 ASSESSMENT — LIFESTYLE VARIABLES: SMOKING_STATUS: 0

## 2025-03-27 NOTE — INTERVAL H&P NOTE
Office H+P reviewed and updated. Pt seen and examined. No changes. Scanned to Epic.  Jami Duncan M.D.

## 2025-03-27 NOTE — DISCHARGE INSTRUCTIONS
[facility], [address], [place] [address]  [facility], [address], [address], [place] [address]  Office: [phone]  Fax:    [phone]    PROCEDURE: Procedure(s):  HYSTEROSCOPY, DILATATION AND CURETTAGE    NOTIFY [facility] IMMEDIATELY IF ANY OF THE FOLLOWING OCCUR:    You are unable to urinate.  Urgency to urinate is not uncommon.  Excessive vaginal bleeding > 1 maxi pad an hour for more then 2 hours straight.  Temperature above 101.0° and / or chills.  You are nauseous and / or vomiting and you cannot hold down any fluids.  Your pain is not controlled with the pain medication prescribed.    SPECIAL CONSIDERATIONS:     Do not drive for at least 24 hours after the procedure and until you are no longer taking narcotic pain medication and you are able to move and react without hesitation.  You may return to work in 1-2 days       [x] Pelvic rest (nothing in the vagina) for 1 week     [x] No heavy lifting over 10 pounds & no strenuous exercise for 1-2 days      [] Other instructions:         MEDICATIONS: PROVIDED AT DISCHARGE OR PREVIOUSLY PRESCRIBED AT PRE OPERATIVE APPOINTMENT WITH [facility] --  Narcotic Pain Med.   []  Norco/Vicodin   []  Percocet®   []  Dilaudid®    Non-narcotic Pain Med.  [x]   Ibuprofen        Antibiotics   []  Cipro®   []  Keflex®     []  Bactrim DS®       Urgency   []   Vesicare®       Nausea      []    Zofran®     []    Phenergan®       Other   []    Colace          [x] Rx provided to patient at pre operative appointment  [] Rx sent electronically today  [] Rx printed today      Our office staff will call you for a post operative check in 1-2 days. If you have not already scheduled your post operative appointment please do so with our office staff during this phone call. Your post operative appointment should be in 2 weeks.    Please contact [facility] at [phone] or go to the nearest Emergency Department  bruising or bleeding from the cut (incision) made by your doctor. But problems may occur that cause you to bleed too much in the surgery area.  An injury to a blood vessel can cause bleeding after surgery. Other causes include medicines such as aspirin or anticoagulants (blood thinners).  To help stop the bleeding, your doctor may have put pressure on the incision or sewn up or cauterized (sealed) the incision. Or you may have had surgery to stop bleeding inside the surgery area. Your doctor also may have given you medicines that help stop the bleeding.  How can you care for yourself at home?  If you have strips of tape on the incision, leave the tape on until it falls off. Or follow your doctor's instructions for removing the tape. Keep the area dry at all times.  You will have a dressing over the incision. A dressing helps the incision heal and protects it. Your doctor will tell you how to take care of this.  If you do not have tape on the incision, wash the area daily with warm, soapy water, and pat it dry. Don't use hydrogen peroxide or alcohol, which can slow healing.    When should you call for help?    Call your doctor now or seek immediate medical care if:    You are dizzy or lightheaded, or you feel like you may faint.     You have bleeding that starts again or gets worse, such as soaking one or more bandages over 2 to 4 hours, even after holding pressure on the area.         __________________________________________________________________________________________________________________________________

## 2025-03-27 NOTE — PERIOP NOTE
Reviewed PTA medication list with patient/caregiver and patient/caregiver denies any additional medications.     Patient admits to having a responsible adult care for them at home for at least 24 hours after surgery.    Patient encouraged to use gown warming system and informed that using said warming gown to regulate body temperature prior to a procedure has been shown to help reduce the risks of blood clots and infection.    Patient's pharmacy of choice verified and documented in PTA medication section.    Dual skin assessment & fall risk band verification completed with Kyung LOVE RN.

## 2025-03-27 NOTE — OP NOTE
Patient: Charlotte Mayorga  MRN: [id number]  : 1975  Pre Operative Diagnosis: Metrorrhagia [N92.1]  Dysmenorrhea [N94.6]  Post Operative Diagnosis: Metrorrhagia [N92.1]  Dysmenorrhea [N94.6]  Procedure: Procedure(s):  HYSTEROSCOPY, DILATATION AND CURETTAGE  Surgeon: Surgeons and Role:     * Jami Duncan MD - Primary  Surgical Assistant: Circulator: Jayla De Los Santos RN  Relief Scrub: Kina Martin  Scrub Person First: Ricky Mcfarland  Scrub Person Second: Ashwin Mcknight  Anesthesia: General  Specimens:   ID Type Source Tests Collected by Time Destination   A : ENDOMETRIAL CURRETTINGS Tissue Uterus SURGICAL PATHOLOGY Jami Duncan MD 3/27/2025 1300      Implants: * No implants in log *  EBL: < 10 mL  Findings: thin appearing endometrium with scarring consistent with ablation  Disposition: To RR, stable    Procedure:   Patient was taken to the OR after informed consent had been obtained. Surgery identification was completed with the patient and the OR team. She was then placed under LMA, prepped and draped in a sterile fashion. Patient identification and surgery identification were completed with the surgeon and the OR team. Attention was turned to the vagina and a weighted speculum was placed. The anterior lip of the cervix was grasped with a single toothed tenaculum. Paracervical block was placed using 10 cc of 0.25% marcaine injected in the normal fashion. The uterus was sounded to 8 cm. The cervix was serially dilated to allow passage of a 5 mm diagnostic hysteroscope. Hysteroscopy was performed with the above noted findings. D&C was performed until a gritty texture was obtained throughout the uterine cavity.The specimen was sent for permanent pathology. Hysteroscopy was repeated with no evidence of retained tissue fragments. Hysteroscope was removed and all other instruments were removed. Sponge, lap, needle and instrument counts were correct x 2. The patient was awoken from anesthesia and

## 2025-03-27 NOTE — PERIOP NOTE
TRANSFER - IN REPORT:    Verbal report received from SHERIF Lerner on Charlotte Mayorga  being received from PACU for routine progression of patient care      Report consisted of patient's Situation, Background, Assessment and   Recommendations(SBAR).     Information from the following report(s) Nurse Handoff Report, Intake/Output, and MAR was reviewed with the receiving nurse.    Opportunity for questions and clarification was provided.      Assessment completed upon patient's arrival to unit and care assumed.

## 2025-03-27 NOTE — PERIOP NOTE
Family updated regarding Pt's condition and care progress.    Pt in second floor waiting area in a wheelchair-needs assistance

## 2025-03-27 NOTE — ANESTHESIA POSTPROCEDURE EVALUATION
Post-Anesthesia Evaluation & Assessment    Vitals  BP: 116/68  Temp: 98 °F (36.7 °C)  Temp Source: Temporal  Pulse: 83  Respirations: 16  SpO2: 100 %  Height: 167.6 cm (5' 5.98\")  Weight - Scale: 69.8 kg (153 lb 14.4 oz)  Pain Level: 0    Nausea/Vomiting: Controlled.    Post-operative hydration adequate.    Pain managed.    Mental status & Level of consciousness: alert and oriented x 3    Neurological status: moves all extremities, sensation grossly intact    Pulmonary status: airway patent, adequate oxygenation.    Complications related to anesthesia: none    Patient has met all PACU discharge requirements.      Aristeo Thomas, DO

## 2025-03-27 NOTE — ANESTHESIA PRE PROCEDURE
Department of Anesthesiology  Preprocedure Note       Name:  Charlotte Mayorga   Age:  49 y.o.  :  1975                                          MRN:  [id number]         Date:  3/27/2025      Surgeon: Surgeon(s):  Jami Duncan MD    Procedure: Procedure(s):  HYSTEROSCOPY, DILATATION AND CURETTAGE    Medications prior to admission:   Prior to Admission medications    Medication Sig Start Date End Date Taking? Authorizing Provider   atorvastatin (LIPITOR) 10 MG tablet Take 1 tablet by mouth every evening Indications: high chol   Yes Juanita Auguste MD   topiramate (TOPAMAX) 50 MG tablet Take 1 tablet by mouth 2 times daily Indications: migraines   Yes Juanita Auguste MD   Misc Natural Products (PUMPKIN SEED OIL PO) Take 3 capsules by mouth daily   Yes Juanita Auguste MD   Multiple Vitamin (MULTIVITAMIN) TABS tablet Take 1 tablet by mouth daily   Yes Juanita Auguste MD   Cholecalciferol (VITAMIN D3) 25 MCG CAPS Take 1 tablet by mouth daily   Yes Juanita Auguste MD   albuterol sulfate HFA (VENTOLIN HFA) 108 (90 Base) MCG/ACT inhaler Inhale 2 puffs into the lungs every 6 hours as needed for Wheezing   Yes Juanita Auguste MD       Current medications:    Current Facility-Administered Medications   Medication Dose Route Frequency Provider Last Rate Last Admin    0.9 % sodium chloride infusion   IntraVENous Continuous Jami Duncan  mL/hr at 25 1000 New Bag at 25 1000       Allergies:    Allergies   Allergen Reactions    Nitrofurantoin      Other Reaction(s): GI intolerance    Nausea      Microbid       Problem List:    Patient Active Problem List   Diagnosis Code    Abnormal Papanicolaou smear of cervix R87.619    Migraine with aura G43.109    Thyroid disease E07.9    Chronic constipation K59.09    Acquired hypothyroidism E03.9    TBI (traumatic brain injury) ([facility]) S06.9XAA    Herpes genitalis in women A60.09    HSV infection B00.9    Fibroid, uterine

## 2025-03-27 NOTE — PERIOP NOTE
TRANSFER - OUT REPORT:    Verbal report given to LORETTA GORMAN on Charlotte Mayorga  being transferred to PHASE 2 for routine post-op       Report consisted of patient's Situation, Background, Assessment and   Recommendations(SBAR).     Information from the following report(s) Nurse Handoff Report, Surgery Report, Intake/Output, and MAR was reviewed with the receiving nurse.           Lines:   Peripheral IV 03/27/25 Left;Posterior Hand (Active)   Site Assessment Clean, dry & intact 03/27/25 1332   Line Status Infusing 03/27/25 1332   Phlebitis Assessment No symptoms 03/27/25 1332   Infiltration Assessment 0 03/27/25 1332   Dressing Status Clean, dry & intact 03/27/25 1332   Dressing Type Transparent 03/27/25 1332        Opportunity for questions and clarification was provided.      Patient transported with:  Tech      Intake/Output Summary (Last 24 hours) at 3/27/2025 1357  Last data filed at 3/27/2025 1344  Gross per 24 hour   Intake 1650 ml   Output 10 ml   Net 1640 ml         Family updated on patient current status at this time.

## (undated) DEVICE — SYRINGE MED 10ML LUERLOCK TIP W/O SFTY DISP

## (undated) DEVICE — GARMENT,MEDLINE,DVT,INT,CALF,MED, GEN2: Brand: MEDLINE

## (undated) DEVICE — SOLUTION IV 1000ML LAC RINGERS PH 6.5 INJ USP VIAFLX PLAS

## (undated) DEVICE — SEAL INSTR PRT SELF SEAL

## (undated) DEVICE — GLOVE SURG SZ 65 CRM LTX FREE POLYISOPRENE POLYMER BEAD ANTI

## (undated) DEVICE — D&C HYSTEROSCOPY: Brand: MEDLINE INDUSTRIES, INC.